# Patient Record
Sex: FEMALE | Race: WHITE | NOT HISPANIC OR LATINO | Employment: OTHER | URBAN - METROPOLITAN AREA
[De-identification: names, ages, dates, MRNs, and addresses within clinical notes are randomized per-mention and may not be internally consistent; named-entity substitution may affect disease eponyms.]

---

## 2017-09-27 ENCOUNTER — OFFICE VISIT (OUTPATIENT)
Dept: CARDIOLOGY | Facility: CLINIC | Age: 62
End: 2017-09-27

## 2017-09-27 VITALS
HEART RATE: 80 BPM | DIASTOLIC BLOOD PRESSURE: 86 MMHG | WEIGHT: 210.4 LBS | SYSTOLIC BLOOD PRESSURE: 139 MMHG | HEIGHT: 64 IN | BODY MASS INDEX: 35.92 KG/M2

## 2017-09-27 DIAGNOSIS — I25.9 ISCHEMIC HEART DISEASE: Primary | ICD-10-CM

## 2017-09-27 DIAGNOSIS — I10 CHRONIC HYPERTENSION: ICD-10-CM

## 2017-09-27 DIAGNOSIS — G47.33 OBSTRUCTIVE SLEEP APNEA: ICD-10-CM

## 2017-09-27 DIAGNOSIS — E78.5 DYSLIPIDEMIA: ICD-10-CM

## 2017-09-27 PROCEDURE — 99214 OFFICE O/P EST MOD 30 MIN: CPT | Performed by: INTERNAL MEDICINE

## 2017-09-27 NOTE — PROGRESS NOTES
Subjective:     Encounter Date:09/27/2017      Patient ID: Анна Perez is a 61 y.o.  white female, retired  from [place], residing in [place], [place].    REFERRING PROVIDER:  [facility] ED  PHYSICIAN:  Clinton Mathis MD  INTERVENTIONAL CARDIOLOGIST:  Kyle Land MD, [facility]  ENDOCRINOLOGIST:   Clayton Palacios MD    Chief Complaint:   Chief Complaint   Patient presents with   • Chest Pain     Problem List:  1. Ischemic heart disease:   a. Remote CCS class III chest pain syndrome  with NYHA class II dyspnea with non-ST elevation MI and elevated troponin (15) with subsequent left heart catheterization demonstrating subtotal occlusion of the ramus intermedius artery, treated with 3.0 mm. x 38 mm. XIENCE JAVON as well as 95% stenosis  of the first obtuse marginal branch LCX, treated with 2.75 x 12 mm. JAVON with residual 70% stenosis of the proximal LAD and continued medical therapy felt warranted with reduced angiographic LVEF (0.35), October 2013.   b. Residual class I symptoms with  improved acceptable echocardiogram, November 2013.   c. Abnormal IV Lexiscan Cardiolite study with small distal anterolateral scar without associated marked reversible ischemia in the absence of marked segmental wall motion abnormality, with LVEF 0.66, 08/22/2014.  d. Residual class I symptoms.  2. Chronic hypertension, probably essential.   3. Dyslipidemia.   4. Type 2 diabetes mellitus (hemoglobin  A1c 8.9%, October 2013; 6.2% January 2014).   5. Chronic hypothyroidism/replacement therapy.   6. Obstructive sleep apnea, but not with the use of CPAP.  7. Chronic anxiety.   8. Moderate obesity (BMI 37).  9. Remote operations:  a. Cholecystectomy.  b. BTL.   10. Remote tobacco use, resolved.      Allergies   Allergen Reactions   • Ramipril Cough         Current Outpatient Prescriptions:   •  carvedilol (COREG) 12.5 MG tablet, Take  by mouth 2 (two) times a day., Disp: , Rfl:   •  insulin NPH-insulin  "regular (Novolin 70/30) (70-30) 100 UNIT/ML injection, Inject 55 Units under the skin 2 (two) times a day with meals., Disp: , Rfl:   •  Levothyroxine Sodium (SYNTHROID PO), Take 250 mcg by mouth daily., Disp: , Rfl:   •  metFORMIN (GLUCOPHAGE) 1000 MG tablet, Take 1 tablet by mouth 2 (two) times a day., Disp: , Rfl:   •  PARoxetine (PAXIL) 40 MG tablet, Take 40 mg by mouth every morning., Disp: , Rfl:   •  ranitidine (ZANTAC) 150 MG capsule, Take  by mouth daily., Disp: , Rfl:     HISTORY OF PRESENT ILLNESS:  Patient is here for a 1 year follow-up.  She denies any chest discomfort, shortness of breath, palpitations, presyncope, syncope, or edema.  She is noncompliant with her CPAP.  She got in an argument with the company over her old machine and is now trying to get paired up with a new company.  Her hemoglobin A1c is 9.1%, which she states is lower than it had been over the past year.  She has had difficulties managing her thyroid and is working with her physician on this; data deficit.  She spent her summer in [place] babysitting her grandchildren.  Also her son got .  She is not engaging in much physical activity.  She is involved with a [employer] group.  Patient otherwise denies chest pain, shortness of breath, PND, edema, palpitations, syncope or presyncope at this time on limited activity.      Review of Systems   Musculoskeletal: Positive for back pain.   Gastrointestinal: Positive for heartburn.   Neurological: Positive for excessive daytime sleepiness.      Obtained and otherwise negative except as outlined in problem list and HPI.    Procedures       Objective:       Vitals:    09/27/17 1536 09/27/17 1537   BP: 160/79 139/86   BP Location: Left arm Left arm   Patient Position: Sitting Standing   Pulse: 74 80   Weight: 210 lb 6.4 oz (95.4 kg)    Height: 63.5\" (161.3 cm)    Recheck blood pressure right arm sitting was 138/60  Body mass index is 36.69 kg/(m^2).  Last weight July 2016 was 219 " pounds    Physical Exam   Constitutional: She appears well-developed and well-nourished.   HENT:   Head: Normocephalic and atraumatic.   Mouth/Throat: Oropharynx is clear and moist.   Neck: Neck supple. No JVD present. Carotid bruit is not present. No thyromegaly present.   Cardiovascular: Normal rate and regular rhythm.  Exam reveals no gallop, no S3 and no friction rub.    No murmur heard.  Pulses:       Dorsalis pedis pulses are 2+ on the right side, and 2+ on the left side.        Posterior tibial pulses are 2+ on the right side, and 2+ on the left side.   Pulmonary/Chest: Effort normal and breath sounds normal.   Abdominal: Soft. She exhibits no mass. There is no hepatosplenomegaly. There is no tenderness.   Bowel sounds audible X 4   Musculoskeletal: Normal range of motion. She exhibits no edema.   Lymphadenopathy:     She has no cervical adenopathy.   Neurological: She is alert.   Skin: Skin is warm, dry and intact.         Lab Review: 8/3/17 (reviewed with patient in office)  · CMP:  Glucose 257, BUN 9, creatinine 0.71, GFR 94, sodium 141, potassium 4, chloride 101, carbon dioxide 22, calcium 8.9, protein 6.6, albumin 4.1, bilirubin 0.5, alkaline phosphatase 68, AST 17, ALT 15  · Lipid panel:  Total cholesterol 95, triglycerides 126, HDL 41, LDL 29  · Hemoglobin A1c - 9.1%  · T4 - 1.81  · TSH - 0.061  · Vitamin B12 - 241      Lab Results   Component Value Date    WBC 7.84 06/29/2015    HGB 12.7 06/29/2015    HCT 38.9 06/29/2015    MCV 87.2 06/29/2015     06/29/2015             Assessment:   Overall continued acceptable course with no interim cardiopulmonary complaints with acceptable functional status. We will defer additional diagnostic or therapeutic intervention from a cardiac perspective at this time. Encouraged the patient to engage in more physical activity and work with her physician on lowering her hemoglobin A1c to a goal of 7%.  Lipids appear nominal.     Diagnosis Plan   1. Ischemic heart  disease  Stable    2. Chronic hypertension  Controlled    3. Dyslipidemia  Acceptable control   4. Obstructive sleep apnea  Noncompliant; Working on getting a new CPAP machine with a different company           Plan:         1. Patient to continue current medications and close follow up with the above providers.  2. Tentative cardiology follow up in 1 year or patient may return sooner PRN.  3. 1-800 card provided.    Scribed for Cyrus Torres MD by Isatu Haney, APRN. 9/27/2017  4:09 PM    I, Cyrus Torres MD, [facility], personally performed the services described in this documentation as scribed by the above named individual in my presence, and it is both accurate and complete. At 4:06 PM on 09/27/2017

## 2018-10-03 ENCOUNTER — OFFICE VISIT (OUTPATIENT)
Dept: CARDIOLOGY | Facility: CLINIC | Age: 63
End: 2018-10-03

## 2018-10-03 VITALS
WEIGHT: 216 LBS | HEART RATE: 88 BPM | DIASTOLIC BLOOD PRESSURE: 60 MMHG | SYSTOLIC BLOOD PRESSURE: 111 MMHG | HEIGHT: 63 IN | BODY MASS INDEX: 38.27 KG/M2

## 2018-10-03 DIAGNOSIS — E78.5 DYSLIPIDEMIA: ICD-10-CM

## 2018-10-03 DIAGNOSIS — I25.9 ISCHEMIC HEART DISEASE: Primary | ICD-10-CM

## 2018-10-03 DIAGNOSIS — I10 CHRONIC HYPERTENSION: ICD-10-CM

## 2018-10-03 DIAGNOSIS — E11.8 TYPE 2 DIABETES MELLITUS WITH COMPLICATION, WITHOUT LONG-TERM CURRENT USE OF INSULIN (HCC): ICD-10-CM

## 2018-10-03 PROCEDURE — 99214 OFFICE O/P EST MOD 30 MIN: CPT | Performed by: INTERNAL MEDICINE

## 2018-10-03 RX ORDER — ROSUVASTATIN CALCIUM 40 MG/1
TABLET, COATED ORAL DAILY
COMMUNITY

## 2018-10-03 RX ORDER — ASPIRIN 81 MG/1
81 TABLET ORAL DAILY
COMMUNITY

## 2018-10-03 NOTE — PROGRESS NOTES
Subjective:     Encounter Date:10/03/2018    Patient ID: Анна Perez is a 62 y.o.  white female, retired  from [place], residing in [place], [place].     REFERRING PROVIDER:  [facility] ED  PHYSICIAN:  Clinton Mathis MD  INTERVENTIONAL CARDIOLOGIST:  Kyle Land MD, [facility]  ENDOCRINOLOGIST:   Clayton Palacios MD    Chief Complaint:   Chief Complaint   Patient presents with   • Coronary Artery Disease   • Hypertension     Problem List:  1. Ischemic heart disease:   a. Remote CCS class III chest pain syndrome  with NYHA class II dyspnea with non-ST elevation MI and elevated troponin (15) with subsequent left heart catheterization demonstrating subtotal occlusion of the ramus intermedius artery, treated with 3.0 mm. x 38 mm. XIENCE JAVON as well as 95% stenosis  of the first obtuse marginal branch LCX, treated with 2.75 x 12 mm. JAVON with residual 70% stenosis of the proximal LAD and continued medical therapy felt warranted with reduced angiographic LVEF (0.35), October 2013.   b. Residual class I symptoms with  improved acceptable echocardiogram, November 2013.   c. Abnormal IV Lexiscan Cardiolite study with small distal anterolateral scar without associated marked reversible ischemia in the absence of marked segmental wall motion abnormality, with LVEF 0.66, 08/22/2014.  d. Residual class I symptoms.  2. Chronic hypertension, probably essential.   3. Dyslipidemia.   4. Type 2 diabetes mellitus (hemoglobin  A1c 8.9%, October 2013; 6.2% January 2014, 9.3% September 2018).   5. Chronic hypothyroidism/replacement therapy.   6. Obstructive sleep apnea, but not with the use of CPAP.  7. Chronic anxiety.   8. Moderate obesity (BMI 38.3).  9. Remote operations:  a. Cholecystectomy.  b. BTL.   10. Remote tobacco use, resolved.       Allergies   Allergen Reactions   • Ramipril Cough         Current Outpatient Prescriptions:   •  aspirin 81 MG EC tablet, Take 81 mg by mouth Daily., Disp:  , Rfl:   •  carvedilol (COREG) 12.5 MG tablet, Take 12.5 mg by mouth 2 (Two) Times a Day., Disp: , Rfl:   •  insulin NPH-insulin regular (Novolin 70/30) (70-30) 100 UNIT/ML injection, Inject 55 Units under the skin 2 (two) times a day with meals., Disp: , Rfl:   •  Levothyroxine Sodium (SYNTHROID PO), Take 250 mcg by mouth daily., Disp: , Rfl:   •  metFORMIN (GLUCOPHAGE) 1000 MG tablet, Take 1 tablet by mouth 2 (two) times a day., Disp: , Rfl:   •  PARoxetine (PAXIL) 40 MG tablet, Take 40 mg by mouth every morning., Disp: , Rfl:   •  ranitidine (ZANTAC) 150 MG capsule, Take  by mouth daily., Disp: , Rfl:   •  rosuvastatin (CRESTOR) 40 MG tablet, Take  by mouth Daily., Disp: , Rfl:     HISTORY OF PRESENT ILLNESS: Patient returns for scheduled annual followup.  She denies any chest pain, shortness of breath, presyncope, syncope, or edema.  She occasionally has palpitations that last 1-2 seconds but not very frequently.  She had laboratory studies 2 weeks ago and was told everything was normal except for her HgbA1C which was 9.3%; data deficit.  She is working with her physician in this regard and is going to try to lose weight and increase her physical activity.  In December 2018, her physician is planning on adding another medication for her diabetes mellitus if she cannot improve her A1c.  She has a dog now and is planning on walking more frequently with it.  Patient otherwise denies chest pain, shortness of breath, PND, edema, palpitations, syncope or presyncope at this time.  She had a delightful 1-month visit to [place] this past summer and took her aunt and uncle, as well as other family members, to [place] to celebrate their 50th wedding anniversary.        Review of Systems   Skin: Positive for dry skin.   Musculoskeletal: Positive for arthritis.      Obtained and otherwise negative except as outlined in problem list and HPI.    Procedures       Objective:       Vitals:    10/03/18 1349 10/03/18 1350  "  BP: 133/70 111/60   BP Location: Right arm Right arm   Patient Position: Sitting Standing   Pulse: 79 88   Weight: 98 kg (216 lb) 98 kg (216 lb)   Height: 160 cm (63\") 160 cm (63\")     Body mass index is 38.26 kg/m².   Last weight:  210 lbs.    Physical Exam   Constitutional: She is oriented to person, place, and time. She appears well-developed and well-nourished.   Neck: No JVD present. Carotid bruit is not present. No thyromegaly present.   Cardiovascular: Regular rhythm, S1 normal, S2 normal and normal heart sounds.  Exam reveals no gallop, no S3 and no friction rub.    No murmur heard.  Pulses:       Dorsalis pedis pulses are 2+ on the right side, and 2+ on the left side.        Posterior tibial pulses are 2+ on the right side, and 2+ on the left side.   Pulmonary/Chest: Effort normal and breath sounds normal. She has no wheezes. She has no rhonchi. She has no rales.   Abdominal: Soft. She exhibits no mass. There is no hepatosplenomegaly. There is no tenderness. There is no guarding.   Bowel sounds audible x4   Musculoskeletal: Normal range of motion. She exhibits no edema.   Lymphadenopathy:     She has no cervical adenopathy.   Neurological: She is alert and oriented to person, place, and time.   Skin: Skin is warm, dry and intact. No rash noted.   Vitals reviewed.        Lab Review:     Lab Results   Component Value Date    WBC 7.84 06/29/2015    HGB 12.7 06/29/2015    HCT 38.9 06/29/2015    MCV 87.2 06/29/2015     06/29/2015       Lab Results   Component Value Date    BNP 20 06/29/2015           Assessment:   Overall continued acceptable course with no interim cardiopulmonary complaints with acceptable functional status. We will defer additional diagnostic or therapeutic intervention from a cardiac perspective at this time. We encouraged the patient to increase her physical activity and to watch her diet to help better control her diabetes. She is working with her physician for this and we would " suggest Ozempic if her diabetes in December 2018 is not significantly improved.        Diagnosis Plan   1. Ischemic heart disease  Stable   2. Chronic hypertension  Controlled   3. Type 2 diabetes mellitus with complication, without long-term current use of insulin (CMS/formerly [facility])  A1C 9.3%, would suggest Ozempic if her A1C is not significantly improved in December 2018 with diet modifications and increase in physical activity   4. Dyslipidemia  No new labs to review          Plan:         1. Patient to continue current medications and close follow up with the above providers.  2. Tentative cardiology follow up in September 2019 or patient may return sooner PRN.       Scribed for Cyrus Torres MD by Adrian Haney, KATHY. 10/3/2018  2:14 PM    I, Cyrus Torres MD, [facility], personally performed the services described in this documentation as scribed by the above named individual in my presence, and it is both accurate and complete. At 2:34 PM on 10/03/2018

## 2019-12-12 ENCOUNTER — HOSPITAL ENCOUNTER (OUTPATIENT)
Dept: BONE DENSITY | Facility: HOSPITAL | Age: 64
Discharge: HOME | End: 2019-12-12
Admitting: FAMILY MEDICINE

## 2019-12-12 DIAGNOSIS — N95.9 MENOPAUSAL PROBLEM: ICD-10-CM

## 2019-12-12 PROCEDURE — 77080 DXA BONE DENSITY AXIAL: CPT | Performed by: RADIOLOGY

## 2019-12-12 PROCEDURE — 77080 DXA BONE DENSITY AXIAL: CPT

## 2021-02-25 DIAGNOSIS — Z23 IMMUNIZATION DUE: ICD-10-CM

## 2021-03-29 ENCOUNTER — IMMUNIZATION (OUTPATIENT)
Dept: VACCINE CLINIC | Facility: HOSPITAL | Age: 66
End: 2021-03-29

## 2021-03-29 PROCEDURE — 91300 HC SARSCOV02 VAC 30MCG/0.3ML IM: CPT | Performed by: INTERNAL MEDICINE

## 2021-03-29 PROCEDURE — 0001A: CPT | Performed by: INTERNAL MEDICINE

## 2021-04-19 ENCOUNTER — IMMUNIZATION (OUTPATIENT)
Dept: VACCINE CLINIC | Facility: HOSPITAL | Age: 66
End: 2021-04-19

## 2021-04-19 PROCEDURE — 91300 HC SARSCOV02 VAC 30MCG/0.3ML IM: CPT | Performed by: INTERNAL MEDICINE

## 2021-04-19 PROCEDURE — 0002A: CPT | Performed by: INTERNAL MEDICINE

## 2021-10-13 ENCOUNTER — HOSPITAL ENCOUNTER (OUTPATIENT)
Dept: MAMMOGRAPHY | Facility: HOSPITAL | Age: 66
Discharge: HOME | End: 2021-10-13

## 2021-10-13 ENCOUNTER — IMMUNIZATION (OUTPATIENT)
Dept: VACCINE CLINIC | Facility: HOSPITAL | Age: 66
End: 2021-10-13

## 2021-10-13 DIAGNOSIS — Z12.31 VISIT FOR SCREENING MAMMOGRAM: ICD-10-CM

## 2021-10-13 PROCEDURE — 77063 BREAST TOMOSYNTHESIS BI: CPT | Performed by: RADIOLOGY

## 2021-10-13 PROCEDURE — 77063 BREAST TOMOSYNTHESIS BI: CPT

## 2021-10-13 PROCEDURE — 77067 SCR MAMMO BI INCL CAD: CPT

## 2021-10-13 PROCEDURE — 0004A ADM SARSCOV2 30MCG/0.3ML BOOSTER: CPT | Performed by: INTERNAL MEDICINE

## 2021-10-13 PROCEDURE — 91300 HC SARSCOV02 VAC 30MCG/0.3ML IM: CPT | Performed by: INTERNAL MEDICINE

## 2021-10-13 PROCEDURE — 77067 SCR MAMMO BI INCL CAD: CPT | Performed by: RADIOLOGY

## 2021-10-22 ENCOUNTER — APPOINTMENT (OUTPATIENT)
Dept: MAMMOGRAPHY | Facility: HOSPITAL | Age: 66
End: 2021-10-22

## 2022-10-14 ENCOUNTER — HOSPITAL ENCOUNTER (OUTPATIENT)
Dept: MAMMOGRAPHY | Facility: HOSPITAL | Age: 67
Discharge: HOME | End: 2022-10-14

## 2022-10-14 ENCOUNTER — HOSPITAL ENCOUNTER (OUTPATIENT)
Dept: BONE DENSITY | Facility: HOSPITAL | Age: 67
Discharge: HOME | End: 2022-10-14

## 2022-10-14 DIAGNOSIS — Z12.31 VISIT FOR SCREENING MAMMOGRAM: ICD-10-CM

## 2022-10-14 DIAGNOSIS — Z78.0 POSTMENOPAUSAL: ICD-10-CM

## 2022-10-14 PROCEDURE — 77063 BREAST TOMOSYNTHESIS BI: CPT | Performed by: RADIOLOGY

## 2022-10-14 PROCEDURE — 77067 SCR MAMMO BI INCL CAD: CPT | Performed by: RADIOLOGY

## 2022-10-14 PROCEDURE — 77067 SCR MAMMO BI INCL CAD: CPT

## 2022-10-14 PROCEDURE — 77080 DXA BONE DENSITY AXIAL: CPT | Performed by: RADIOLOGY

## 2022-10-14 PROCEDURE — 77080 DXA BONE DENSITY AXIAL: CPT

## 2022-10-14 PROCEDURE — 77063 BREAST TOMOSYNTHESIS BI: CPT

## 2024-09-04 ENCOUNTER — TRANSCRIBE ORDERS (OUTPATIENT)
Dept: ADMINISTRATIVE | Facility: HOSPITAL | Age: 69
End: 2024-09-04
Payer: MEDICARE

## 2024-09-04 DIAGNOSIS — Z78.0 ASYMPTOMATIC MENOPAUSAL STATE: Primary | ICD-10-CM

## 2024-09-04 DIAGNOSIS — Z12.31 VISIT FOR SCREENING MAMMOGRAM: ICD-10-CM

## 2025-05-09 ENCOUNTER — EXTERNAL PBMM DATA (OUTPATIENT)
Dept: PHARMACY | Facility: OTHER | Age: 70
End: 2025-05-09